# Patient Record
Sex: FEMALE | Race: WHITE | NOT HISPANIC OR LATINO | Employment: FULL TIME | URBAN - METROPOLITAN AREA
[De-identification: names, ages, dates, MRNs, and addresses within clinical notes are randomized per-mention and may not be internally consistent; named-entity substitution may affect disease eponyms.]

---

## 2024-03-01 PROCEDURE — 86160 COMPLEMENT ANTIGEN: CPT

## 2024-03-01 PROCEDURE — 82550 ASSAY OF CK (CPK): CPT

## 2024-03-01 PROCEDURE — 82306 VITAMIN D 25 HYDROXY: CPT

## 2024-03-01 PROCEDURE — 85025 COMPLETE CBC W/AUTO DIFF WBC: CPT

## 2024-03-01 PROCEDURE — 86140 C-REACTIVE PROTEIN: CPT

## 2024-03-01 PROCEDURE — 81001 URINALYSIS AUTO W/SCOPE: CPT

## 2024-03-01 PROCEDURE — 86225 DNA ANTIBODY NATIVE: CPT

## 2024-03-01 PROCEDURE — 85652 RBC SED RATE AUTOMATED: CPT

## 2024-03-01 PROCEDURE — 80053 COMPREHEN METABOLIC PANEL: CPT

## 2024-07-01 PROCEDURE — 82550 ASSAY OF CK (CPK): CPT

## 2024-07-01 PROCEDURE — 85652 RBC SED RATE AUTOMATED: CPT

## 2024-07-01 PROCEDURE — 86235 NUCLEAR ANTIGEN ANTIBODY: CPT

## 2024-07-01 PROCEDURE — 84702 CHORIONIC GONADOTROPIN TEST: CPT

## 2024-07-01 PROCEDURE — 85025 COMPLETE CBC W/AUTO DIFF WBC: CPT

## 2024-07-01 PROCEDURE — 80053 COMPREHEN METABOLIC PANEL: CPT

## 2024-07-01 PROCEDURE — 80061 LIPID PANEL: CPT

## 2024-07-01 PROCEDURE — 82306 VITAMIN D 25 HYDROXY: CPT

## 2024-07-01 PROCEDURE — 86140 C-REACTIVE PROTEIN: CPT

## 2024-07-26 ENCOUNTER — SPECIALTY PHARMACY (OUTPATIENT)
Dept: PHARMACY | Facility: CLINIC | Age: 34
End: 2024-07-26

## 2024-07-26 ENCOUNTER — OFFICE VISIT (OUTPATIENT)
Dept: RHEUMATOLOGY | Facility: CLINIC | Age: 34
End: 2024-07-26
Payer: COMMERCIAL

## 2024-07-26 VITALS
HEIGHT: 63 IN | OXYGEN SATURATION: 100 % | WEIGHT: 241.4 LBS | BODY MASS INDEX: 42.77 KG/M2 | SYSTOLIC BLOOD PRESSURE: 105 MMHG | DIASTOLIC BLOOD PRESSURE: 61 MMHG | HEART RATE: 70 BPM

## 2024-07-26 DIAGNOSIS — E55.9 VITAMIN D DEFICIENCY: ICD-10-CM

## 2024-07-26 DIAGNOSIS — Z79.899 ENCOUNTER FOR LONG-TERM (CURRENT) USE OF MEDICATIONS: ICD-10-CM

## 2024-07-26 DIAGNOSIS — M06.09 POLYARTHRITIS WITH NEGATIVE RHEUMATOID FACTOR (MULTI): Primary | ICD-10-CM

## 2024-07-26 DIAGNOSIS — L40.50 PSORIATIC ARTHRITIS (MULTI): ICD-10-CM

## 2024-07-26 PROCEDURE — 3008F BODY MASS INDEX DOCD: CPT | Performed by: INTERNAL MEDICINE

## 2024-07-26 PROCEDURE — 99213 OFFICE O/P EST LOW 20 MIN: CPT | Performed by: INTERNAL MEDICINE

## 2024-07-26 RX ORDER — CELECOXIB 200 MG/1
200 CAPSULE ORAL 2 TIMES DAILY
Qty: 60 CAPSULE | Refills: 11 | Status: SHIPPED | OUTPATIENT
Start: 2024-07-26 | End: 2024-08-25

## 2024-07-26 RX ORDER — ERGOCALCIFEROL 1.25 MG/1
1.25 CAPSULE ORAL
COMMUNITY

## 2024-07-26 ASSESSMENT — ROUTINE ASSESSMENT OF PATIENT INDEX DATA (RAPID3)
GOOD_NIGHTS_SLEEP: WITH SOME DIFFICULTY
ON A SCALE OF ONE TO TEN, CONSIDERING ALL THE WAYS IN WHICH ILLNESS AND HEALTH CONDITIONS MAY AFFECT YOU AT THIS TIME, PLEASE INDICATE BELOW HOW YOU ARE DOING:: 2
IN_OUT_TRANSPORT: WITH SOME DIFFICULTY
ON A SCALE OF ONE TO TEN, HOW MUCH PAIN HAVE YOU HAD BECAUSE OF YOUR CONDITION OVER THE PAST WEEK?: 3.5
WALK_KILOMETERS: WITHOUT ANY DIFFICULTY
ON A SCALE OF ONE TO TEN, HOW MUCH PAIN HAVE YOU HAD BECAUSE OF YOUR CONDITION OVER THE PAST WEEK?: 3.5
TURN_FAUCETS_OFF: WITHOUT ANY DIFFICULTY
SEVERITY_SCORE: MODERATE SEVERITY (MS)
FEELINGS_DEPRESSION: WITHOUT ANY DIFFICULTY
TOTAL RAPID3 SCORE: 7.2
IN_OUT_BED: WITH SOME DIFFICULTY
ON A SCALE OF ONE TO TEN, CONSIDERING ALL THE WAYS IN WHICH ILLNESS AND HEALTH CONDITIONS MAY AFFECT YOU AT THIS TIME, PLEASE INDICATE BELOW HOW YOU ARE DOING:: 2
WASH_DRY_BODY: WITH SOME DIFFICULTY
FEELINGS_ANXIETY_NERVOUS: WITH SOME DIFFICULTY
LIFT_CUP_TO_MOUTH: WITHOUT ANY DIFFICULTY
WALK_FLAT_GROUND: WITHOUT ANY DIFFICULTY
PARTIPATE_RECREATIONAL_ACTIVITIES: WITH SOME DIFFICULTY
FN_SCORE: 1.7
SUM OF QUESTIONS A TO J: 5
WEIGHTED_TOTAL_SCORE: 2.4
DRESS_YOURSELF: WITHOUT ANY DIFFICULTY
SEVERITY_SCORE: 0
PICK_CLOTHES_OFF_FLOOR: WITH SOME DIFFICULTY

## 2024-07-26 ASSESSMENT — ENCOUNTER SYMPTOMS
DEPRESSION: 0
LOSS OF SENSATION IN FEET: 0
OCCASIONAL FEELINGS OF UNSTEADINESS: 1

## 2024-07-26 ASSESSMENT — PATIENT HEALTH QUESTIONNAIRE - PHQ9
SUM OF ALL RESPONSES TO PHQ9 QUESTIONS 1 AND 2: 0
1. LITTLE INTEREST OR PLEASURE IN DOING THINGS: NOT AT ALL
2. FEELING DOWN, DEPRESSED OR HOPELESS: NOT AT ALL

## 2024-07-26 ASSESSMENT — PAIN SCALES - GENERAL: PAINLEVEL_OUTOF10: 3

## 2024-07-26 ASSESSMENT — PAIN - FUNCTIONAL ASSESSMENT: PAIN_FUNCTIONAL_ASSESSMENT: 0-10

## 2024-07-26 NOTE — PROGRESS NOTES
[facility]/  Rheumatology  [address] [place] [address], [address]  [place] [address]  Phone: [phone]  Fax: [phone]    Rheumatology Progress Note 7/26/24    Suzan To is a 33 y.o. female here for   Chief Complaint   Patient presents with    Follow-up       Last Visit: 3/11/24    Rheum Hx      Previous Tx    Health Maintenance  DXA- none  Malignancy Hx- none  Immunization History   Administered Date(s) Administered    DTP 03/06/1991, 05/01/1991, 07/03/1991    DTaP, Unspecified 05/01/1996    Flu vaccine (IIV4), preservative free *Check age/dose* 10/26/2018, 11/21/2019    HPV, Quadrivalent 05/31/2012    Hepatitis B vaccine, 19 yrs and under (RECOMBIVAX, ENGERIX) 10/06/1999, 08/06/2003, 08/04/2004    HiB, unspecified 07/03/1991    Influenza, injectable, quadrivalent 11/14/2017, 10/27/2020, 11/05/2021    Influenza, seasonal, injectable 09/05/2014, 10/15/2015    Influenza, seasonal, injectable, preservative free 09/29/2016    MMR vaccine, subcutaneous (MMR II) 05/01/1996, 10/06/1999    OPV 03/06/1991, 05/01/1991, 05/01/1996    Pfizer Gray Cap SARS-CoV-2 10/27/2022    Td (adult), unspecified 08/06/2003    Tdap vaccine, age 7 year and older (BOOSTRIX, ADACEL) 04/12/2018, 06/28/2023    Varicella vaccine, subcutaneous (VARIVAX) 07/20/2023          Past Medical History:   Diagnosis Date    Elevated testosterone level     Gastroparesis     Obesity     Thyromegaly       History reviewed. No pertinent surgical history.   Current Outpatient Medications   Medication Sig Dispense Refill    acetaminophen (Tylenol) 325 mg tablet Take 2 tablets (650 mg) by mouth every 4 hours if needed.      ergocalciferol (Vitamin D-2) 1.25 MG (48150 UT) capsule Take 1 capsule (1,250 mcg) by mouth 1 (one) time per week.      metFORMIN  mg 24 hr tablet 2 tabs po qpm after dinner      metoprolol tartrate (Lopressor) 25 mg tablet Take by mouth once every 24 hours.      mv-min/iron/folic/calcium/vitK (WOMEN'S  "MULTIVITAMIN ORAL) once every 24 hours.      vit C/zinc citrate/elderberry (ELDERBERRY IMMUNE HEALTH ORAL) Take by mouth.      celecoxib (CeleBREX) 200 mg capsule Take 1 capsule (200 mg) by mouth 2 times a day. 60 capsule 11    tofacitinib (Xeljanz) 5 mg tablet Take 1 tablet (5 mg) by mouth 2 times a day. (Patient not taking: Reported on 7/26/2024) 60 tablet 11     No current facility-administered medications for this visit.      No Known Allergies     Visit Vitals  /61 (BP Location: Right arm, Patient Position: Sitting)   Pulse 70   Ht 1.6 m (5' 3\")   Wt 110 kg (241 lb 6.5 oz)   SpO2 100%   BMI 42.76 kg/m²   Smoking Status Never   BSA 2.21 m²      Pain Assessment 0-10 (Numeric) Pain Score: 3, Pain Location: Generalized     Rapid 3  Function Score (FN): 1.7  Pain Score (PN) (0-10): 3.5  Patient Global (PTGL) (0-10): 2  Rapid3 Score: 7.2  RAPID3 Weighted Score: 2.4       Workup  Component      Latest Ref Rng 7/1/2024   LEUKOCYTES (10*3/UL) IN BLOOD BY AUTOMATED COUNT, [place]      4.4 - 11.3 x10*3/uL 5.4    nRBC      0.0 - 0.0 /100 WBCs 0.0    ERYTHROCYTES (10*6/UL) IN BLOOD BY AUTOMATED COUNT, [place]      4.00 - 5.20 x10*6/uL 4.32    HEMOGLOBIN      12.0 - 16.0 g/dL 12.8    HEMATOCRIT      36.0 - 46.0 % 38.9    MCV      80 - 100 fL 90    MCH      26.0 - 34.0 pg 29.6    MCHC      32.0 - 36.0 g/dL 32.9    RED CELL DISTRIBUTION WIDTH      11.5 - 14.5 % 13.9    PLATELETS (10*3/UL) IN BLOOD AUTOMATED COUNT, [place]      150 - 450 x10*3/uL 232    NEUTROPHILS/100 LEUKOCYTES IN BLOOD BY AUTOMATED COUNT, [place]      40.0 - 80.0 % 58.0    Immature Granulocytes %, Automated      0.0 - 0.9 % 0.0    Lymphocytes %      13.0 - 44.0 % 30.9    Monocytes %      2.0 - 10.0 % 9.4    Eosinophils %      0.0 - 6.0 % 1.3    Basophils %      0.0 - 2.0 % 0.4    NEUTROPHILS (10*3/UL) IN BLOOD BY AUTOMATED COUNT, [place]      1.20 - 7.70 x10*3/uL 3.13    Immature Granulocytes Absolute, Automated      0.00 - 0.70 x10*3/uL 0.00  "   Lymphocytes Absolute      1.20 - 4.80 x10*3/uL 1.67    Monocytes Absolute      0.10 - 1.00 x10*3/uL 0.51    Eosinophils Absolute      0.00 - 0.70 x10*3/uL 0.07    Basophils Absolute      0.00 - 0.10 x10*3/uL 0.02    GLUCOSE      65 - 99 mg/dL 79    SODIUM      133 - 145 mmol/L 143    POTASSIUM      3.4 - 5.1 mmol/L 4.2    CHLORIDE      97 - 107 mmol/L 106    Bicarbonate      24 - 31 mmol/L 23 (L)    Blood Urea Nitrogen      8 - 25 mg/dL 15    Creatinine      0.40 - 1.60 mg/dL 0.80    EGFR      >60 mL/min/1.73m*2 >90    Calcium      8.5 - 10.4 mg/dL 9.6    Albumin      3.5 - 5.0 g/dL 4.6    Alkaline Phosphatase      35 - 125 U/L 88    Total Protein      5.9 - 7.9 g/dL 7.1    AST      5 - 40 U/L 14    Bilirubin Total      0.1 - 1.2 mg/dL 0.3    ALT      5 - 40 U/L 16    Anion Gap      <=19 mmol/L 14    T-SPOT. TB Interpretation      Negative  Negative    Panel A Spot Count 1    Panel B Spot Count 0    NIL(NEG) Control Spot Count Passed    POS Control Spot Count Passed    CHOLESTEROL      133 - 200 mg/dL 188    HDL CHOLESTEROL      >50.0 mg/dL 54.0    Cholesterol/HDL Ratio      SEE COMMENT  3.5    LDL Calculated      65 - 130 mg/dL 123    TRIGLYCERIDES      40 - 150 mg/dL 56    C-Reactive Protein      0.00 - 2.00 mg/dL 0.40    Creatine Kinase      24 - 195 U/L 137    Sed Rate      0 - 20 mm/h 26 (H)    Scan Result Vectra 51 (high)    Vitamin D, 25-Hydroxy, Total      31 - 100 ng/mL 20 (L)    Anti-Chromatin      <1.0 AI <0.2    Interleukin 6      <=2.0 pg/mL <2.0    HCG, Beta-Quantitative      SEE COMMENT BELOW mIU/mL <1       DATE OF EXAM: Jul 13 2024  2:56PM      [facility]   0540  -  CT CHEST W IVCON PE  / ACCESSION #  [id number]     PROCEDURE REASON: Pulmonary embolism (PE) suspected, high prob          * * * * Physician Interpretation * * * *     RESULT: EXAMINATION:  CHEST CT WITH CONTRAST (PULMONARY EMBOLISM PROTOCOL)     CLINICAL HISTORY: PE is suspected     Technique:  Spiral CT acquisition of the chest from the  thoracic inlet to   the upper abdomen following IV contrast.  Axial 1 and 3 mm thick slices   plus coronal and sagittal reformatted images.   MQ:  CTCP_5   Contrast:  79 mL Omnipaque 350 IV   CT Radiation dose: Integrated Dose-length product (DLP) for this visit =    390 mGy*cm   CT Dose Reduction Employed: Automated exposure control(AEC) and iterative   recon     Comparison: No relevant prior studies available.     RESULT:     Limitations: None.     Evaluation for thromboembolic disease:        - Right heart chambers:  No thromboembolic disease.        - Main pulmonary arteries:  No thromboembolic disease.        - Lobar pulmonary arteries:  No thromboembolic disease.        - Segmental pulmonary arteries:  No thromboembolic disease.        - Subsegmental pulmonary arteries:  No thromboembolic disease.        - Additional pulmonary artery findings:  The main pulmonary artery   is normal in caliber.     Lines, tubes, and devices:  None.     Lung parenchyma and airways: No consolidation. No suspicious pulmonary   nodule. The central airways are patent.     Pleural space:  No pleural effusion.  No pleural thickening.     Lower neck, lymph nodes, and mediastinum:  The imaged thyroid gland is   normal.  No lymphadenopathy in the supraclavicular, axillary,   mediastinal, or hilar regions.     Heart, pericardium, and thoracic vessels:  The thoracic aorta is normal   in caliber. The cardiac chambers are normal in size. No coronary artery   atherosclerotic calcifications are noted, although the study is not   optimized for coronary assessment. No pericardial effusion or thickening.     Bones and soft tissues:  No destructive bone lesion. Chest wall is   unremarkable.     Upper abdomen:  No abnormality in the imaged upper abdomen.     Localizer images: No additional findings.     Assessment/Plan  1. Polyarthritis with negative rheumatoid factor (Multi)    2. Psoriatic arthritis (Multi)    3. Vitamin D deficiency    4.  Encounter for long-term (current) use of medications         No orders of the defined types were placed in this encounter.       Previously adherent and tolerating Cimzia.   Went off Cimzia when she got shingles- herpes zoster R face/trigeminal which was treated.  Wants to switch and onto get injections.  Few, little psoriasis patches and under breast- not often.  Worse   Denies any recent or current infection.  Not on any NSAIDs or glucocorticoids.  ROS+ for fatigue, abd pain, joint pain, stiffness 30 min in AM, back pain, raynaud's without pitting/ulceration, weakness of legs, numbness/tingling.  Rapid 3 consistent with moderate severity.  Labs reviewed  D/w pt tx options and decided on   Switch to Rinvoq.  Advised of possible side effects and importance of monitoring.   All questions answered.  Patient to follow up with primary care provider regarding all other medical issues not addressed today and for medical chart updating.        Since last appt,   Not on tx  AM stiffness at least 30 min  Very stiff back in the AM, about 330 min\  Feet top and heels  L hip  Better as day goes on.   Voltaren gel prn- not that helpful  Cardiac workup ongoing. Better with bb.  Denies any recent or current infection.  Not on any NSAIDs or glucocorticoids.  Rapid 3 consistent with moderate severity.  Labs reviewed  D/w pt tx options and decided on   Trial different NSAID  Exercises/stretches  Re order biologic as it should be covered by insurance.  Porolanda hendersonss echo and  holter for evalu  Advised of possible side effects and importance of monitoring.   All questions answered.  Patient to follow up with primary care provider regarding all other medical issues not addressed today and for medical chart updating.       Vicky Ledesma MD      Patient Care Team:  Carly Pedraza DO as PCP - General (Family Medicine)  Bonnie Armas MD as PCP - [facility] PCP  Carly Pedraza DO as Primary Care Provider  Vicky  Sean Ledesma MD as Consulting Physician (Rheumatology)

## 2024-08-06 ENCOUNTER — SPECIALTY PHARMACY (OUTPATIENT)
Dept: PHARMACY | Facility: CLINIC | Age: 34
End: 2024-08-06

## 2024-08-06 PROCEDURE — RXMED WILLOW AMBULATORY MEDICATION CHARGE

## 2024-08-07 ENCOUNTER — PHARMACY VISIT (OUTPATIENT)
Dept: PHARMACY | Facility: CLINIC | Age: 34
End: 2024-08-07
Payer: MEDICAID

## 2024-08-08 ENCOUNTER — SPECIALTY PHARMACY (OUTPATIENT)
Dept: PHARMACY | Facility: HOSPITAL | Age: 34
End: 2024-08-08
Payer: COMMERCIAL

## 2024-08-30 ENCOUNTER — SPECIALTY PHARMACY (OUTPATIENT)
Dept: PHARMACY | Facility: CLINIC | Age: 34
End: 2024-08-30

## 2024-08-30 PROCEDURE — RXMED WILLOW AMBULATORY MEDICATION CHARGE

## 2024-09-10 ENCOUNTER — PHARMACY VISIT (OUTPATIENT)
Dept: PHARMACY | Facility: CLINIC | Age: 34
End: 2024-09-10
Payer: MEDICAID

## 2024-10-09 ENCOUNTER — SPECIALTY PHARMACY (OUTPATIENT)
Dept: PHARMACY | Facility: CLINIC | Age: 34
End: 2024-10-09

## 2024-10-17 ENCOUNTER — SPECIALTY PHARMACY (OUTPATIENT)
Dept: PHARMACY | Facility: CLINIC | Age: 34
End: 2024-10-17

## 2024-10-21 ENCOUNTER — SPECIALTY PHARMACY (OUTPATIENT)
Dept: PHARMACY | Facility: CLINIC | Age: 34
End: 2024-10-21

## 2024-10-30 ENCOUNTER — SPECIALTY PHARMACY (OUTPATIENT)
Dept: PHARMACY | Facility: CLINIC | Age: 34
End: 2024-10-30

## 2024-11-25 RX ORDER — METOPROLOL SUCCINATE 25 MG/1
1 TABLET, EXTENDED RELEASE ORAL
COMMUNITY
Start: 2024-07-13

## 2024-12-06 ENCOUNTER — APPOINTMENT (OUTPATIENT)
Dept: RHEUMATOLOGY | Facility: CLINIC | Age: 34
End: 2024-12-06

## 2024-12-26 ENCOUNTER — SPECIALTY PHARMACY (OUTPATIENT)
Dept: PHARMACY | Facility: CLINIC | Age: 34
End: 2024-12-26